# Patient Record
Sex: FEMALE | Race: WHITE | NOT HISPANIC OR LATINO | Employment: STUDENT | URBAN - METROPOLITAN AREA
[De-identification: names, ages, dates, MRNs, and addresses within clinical notes are randomized per-mention and may not be internally consistent; named-entity substitution may affect disease eponyms.]

---
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
## 2024-12-16 ENCOUNTER — TELEPHONE (OUTPATIENT)
Dept: OBSTETRICS AND GYNECOLOGY | Facility: CLINIC | Age: 19
End: 2024-12-16

## 2024-12-16 NOTE — TELEPHONE ENCOUNTER
Caller: Gloria Grimes    Relationship:  Self    Best call back number: [phone]    PATIENT CALLED REQUESTING TO CANCEL SAME DAY APPT.    Did the patient call AFTER the start time of their scheduled appointment?  []YES  [x]NO    Was the patient's appointment rescheduled? []YES  [x]NO    Any additional information: WORK CONFLICT - PT WILL CALL BACK TO R/S

## 2025-02-10 ENCOUNTER — OFFICE VISIT (OUTPATIENT)
Dept: OBSTETRICS AND GYNECOLOGY | Age: 20
End: 2025-02-10
Payer: COMMERCIAL

## 2025-02-10 VITALS — HEIGHT: 64 IN

## 2025-02-10 DIAGNOSIS — O36.80X1 ENCOUNTER TO DETERMINE FETAL VIABILITY OF PREGNANCY, FETUS 1: Primary | ICD-10-CM

## 2025-02-10 DIAGNOSIS — O20.0 THREATENED ABORTION: ICD-10-CM

## 2025-02-10 RX ORDER — PRENATAL VIT/IRON FUM/FOLIC AC 27MG-0.8MG
1 TABLET ORAL DAILY
COMMUNITY

## 2025-02-10 NOTE — PROGRESS NOTES
"GYN Visit    2/10/2025    Patient: Gloria Grimes          MR#:[id number]      Chief Complaint   Patient presents with    Possible Pregnancy     New Gyn & Pregnancy Confirmation US - Pt LMP 25, Pt stating she had some vaginal bleeding after pelvic US today       History of Present Illness    19 y.o. 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    ________________________________________  There is no problem list on file for this patient.      History reviewed. No pertinent past medical history.    Past Surgical History:   Procedure Laterality Date    NO PAST SURGERIES         Social History     Tobacco Use   Smoking Status Never    Passive exposure: Never   Smokeless Tobacco Never       has a current medication list which includes the following prescription(s): prenatal vitamin 27-0.8 and clindamycin.  ________________________________________    Current contraception: none      The following portions of the patient's history were reviewed and updated as appropriate: allergies, current medications, past family history, past medical history, past social history, past surgical history, and problem list.    Review of Systems    Pertinent items are noted in HPI.     Objective   Physical Exam    Ht 162.6 cm (64.02\")   LMP 2024 (Exact Date)    BP Readings from Last 3 Encounters: " "  21 112/60 (60%, Z = 0.25 /  27%, Z = -0.61)*     *BP percentiles are based on the 2017 AAP Clinical Practice Guideline for girls      Wt Readings from Last 3 Encounters:   21 61.7 kg (136 lb) (73%, Z= 0.61)*   21 61.7 kg (136 lb) (73%, Z= 0.61)*     * Growth percentiles are based on [facility] (Girls, 2-20 Years) data.      BMI: Estimated body mass index is 23.33 kg/m² as calculated from the following:    Height as of 21: 162.6 cm (64.02\").    Weight as of 21: 61.7 kg (136 lb).    Lungs: non labored breathing, no wheezing or tachpnea  Extremities: extremities normal, atraumatic, no cyanosis or edema  Skin: Skin color, texture, turgor normal. No rashes or lesions  Neurologic: Grossly normal  General:   alert, appears stated age, and cooperative            See US report                     Assessment:      Diagnoses and all orders for this visit:    1. Encounter to determine fetal viability of pregnancy, fetus 1 (Primary)    2. Threatened   -     ABO / Rh  -     CBC (No Diff)  -     HCG, B-subunit, Quantitative  -     Progesterone        Follow-up 1 week for repeat ultrasound  Discussed miscarriage and warnings given      "

## 2025-02-11 LAB
ABO GROUP BLD: NORMAL
ERYTHROCYTE [DISTWIDTH] IN BLOOD BY AUTOMATED COUNT: 11.8 % (ref 12.3–15.4)
HCG INTACT+B SERPL-ACNC: NORMAL MIU/ML
HCT VFR BLD AUTO: 43.8 % (ref 34–46.6)
HGB BLD-MCNC: 15 G/DL (ref 12–15.9)
MCH RBC QN AUTO: 30.1 PG (ref 26.6–33)
MCHC RBC AUTO-ENTMCNC: 34.2 G/DL (ref 31.5–35.7)
MCV RBC AUTO: 88 FL (ref 79–97)
PLATELET # BLD AUTO: 302 10*3/MM3 (ref 140–450)
PROGEST SERPL-MCNC: 10.7 NG/ML
RBC # BLD AUTO: 4.98 10*6/MM3 (ref 3.77–5.28)
RH BLD: POSITIVE
WBC # BLD AUTO: 9.11 10*3/MM3 (ref 3.4–10.8)

## 2025-02-17 ENCOUNTER — OFFICE VISIT (OUTPATIENT)
Dept: OBSTETRICS AND GYNECOLOGY | Age: 20
End: 2025-02-17
Payer: COMMERCIAL

## 2025-02-17 VITALS
SYSTOLIC BLOOD PRESSURE: 124 MMHG | DIASTOLIC BLOOD PRESSURE: 76 MMHG | HEIGHT: 64 IN | BODY MASS INDEX: 23.22 KG/M2 | WEIGHT: 136 LBS

## 2025-02-17 DIAGNOSIS — Z3A.01 6 WEEKS GESTATION OF PREGNANCY: Primary | ICD-10-CM

## 2025-02-17 DIAGNOSIS — O20.0 THREATENED ABORTION: ICD-10-CM

## 2025-02-17 NOTE — PROGRESS NOTES
"GYN Visit    2025    Patient: Gloria Grimes          MR#:[id number]      Chief Complaint   Patient presents with    Possible Pregnancy     Gyn F/u & Pregnancy Confirmation US - Pt LMP 25, Pt c/o spotting since last US on 2/10/25       History of Present Illness    19 y.o. female  who presents for follow-up to pregnancy confirmation    Patient feels well  No nausea vomiting  Ultrasound was done today showing a 6-week IUP with a heartbeat  Progression is as expected  Patient is very happy with the news  She is taking a prenatal vitamin  I recommend we follow-up in 2 weeks for a recheck of the ultrasound and proceed with her typical prenatal labs at that time      Patient's last menstrual period was 2024 (exact date).    ________________________________________  There is no problem list on file for this patient.      History reviewed. No pertinent past medical history.    Past Surgical History:   Procedure Laterality Date    NO PAST SURGERIES         Social History     Tobacco Use   Smoking Status Never    Passive exposure: Never   Smokeless Tobacco Never       has a current medication list which includes the following prescription(s): prenatal vitamin 27-0.8 and clindamycin.  ________________________________________    Current contraception: none      The following portions of the patient's history were reviewed and updated as appropriate: allergies, current medications, past family history, past medical history, past social history, past surgical history, and problem list.    Review of Systems    Pertinent items are noted in HPI.     Objective   Physical Exam    /76 (BP Location: Left arm, Patient Position: Sitting)   Ht 162.6 cm (64.02\")   Wt 61.7 kg (136 lb)   LMP 2024 (Exact Date)   BMI 23.33 kg/m²    BP Readings from Last 3 Encounters:   25 124/76   21 112/60 (60%, Z = 0.25 /  27%, Z = -0.61)*     *BP percentiles are based on the 2017 AAP Clinical Practice " "Guideline for girls      Wt Readings from Last 3 Encounters:   25 61.7 kg (136 lb)   21 61.7 kg (136 lb) (73%, Z= 0.61)*   21 61.7 kg (136 lb) (73%, Z= 0.61)*     * Growth percentiles are based on [facility] (Girls, 2-20 Years) data.      BMI: Estimated body mass index is 23.33 kg/m² as calculated from the following:    Height as of this encounter: 162.6 cm (64.02\").    Weight as of this encounter: 61.7 kg (136 lb).    Lungs: non labored breathing, no wheezing or tachpnea  Extremities: extremities normal, atraumatic, no cyanosis or edema  Skin: Skin color, texture, turgor normal. No rashes or lesions  Neurologic: Grossly normal  General:   alert, appears stated age, and cooperative                See ultrasound viable 6-week IUP                 Assessment:      Diagnoses and all orders for this visit:    1. 6 weeks gestation of pregnancy (Primary)    2. Threatened       Ultrasound today appears to be a viable IUP  Follow-up 2 weeks for repeat ultrasound and new OB labs        "

## 2025-02-18 ENCOUNTER — HOSPITAL ENCOUNTER (EMERGENCY)
Facility: HOSPITAL | Age: 20
Discharge: HOME | End: 2025-02-18
Attending: STUDENT IN AN ORGANIZED HEALTH CARE EDUCATION/TRAINING PROGRAM | Admitting: STUDENT IN AN ORGANIZED HEALTH CARE EDUCATION/TRAINING PROGRAM
Payer: COMMERCIAL

## 2025-02-18 ENCOUNTER — TELEPHONE (OUTPATIENT)
Dept: OBSTETRICS AND GYNECOLOGY | Age: 20
End: 2025-02-18
Payer: COMMERCIAL

## 2025-02-18 ENCOUNTER — APPOINTMENT (OUTPATIENT)
Dept: ULTRASOUND IMAGING | Facility: HOSPITAL | Age: 20
End: 2025-02-18
Payer: COMMERCIAL

## 2025-02-18 VITALS
HEART RATE: 106 BPM | OXYGEN SATURATION: 99 % | HEIGHT: 64 IN | DIASTOLIC BLOOD PRESSURE: 82 MMHG | BODY MASS INDEX: 23.22 KG/M2 | RESPIRATION RATE: 14 BRPM | WEIGHT: 136 LBS | TEMPERATURE: 99 F | SYSTOLIC BLOOD PRESSURE: 140 MMHG

## 2025-02-18 DIAGNOSIS — O03.9 SPONTANEOUS ABORTION IN FIRST TRIMESTER: Primary | ICD-10-CM

## 2025-02-18 LAB
ALBUMIN SERPL-MCNC: 4.7 G/DL (ref 3.5–5.2)
ALBUMIN/GLOB SERPL: 1.5 G/DL
ALP SERPL-CCNC: 59 U/L (ref 39–117)
ALT SERPL W P-5'-P-CCNC: 29 U/L (ref 1–33)
ANION GAP SERPL CALCULATED.3IONS-SCNC: 10.6 MMOL/L (ref 5–15)
AST SERPL-CCNC: 19 U/L (ref 1–32)
BASOPHILS # BLD AUTO: 0.03 10*3/MM3 (ref 0–0.2)
BASOPHILS NFR BLD AUTO: 0.3 % (ref 0–1.5)
BILIRUB SERPL-MCNC: 0.3 MG/DL (ref 0–1.2)
BILIRUB UR QL STRIP: NEGATIVE
BUN SERPL-MCNC: 8 MG/DL (ref 6–20)
BUN/CREAT SERPL: 12.3 (ref 7–25)
CALCIUM SPEC-SCNC: 9.7 MG/DL (ref 8.6–10.5)
CHLORIDE SERPL-SCNC: 100 MMOL/L (ref 98–107)
CLARITY UR: ABNORMAL
CO2 SERPL-SCNC: 26.4 MMOL/L (ref 22–29)
COLOR UR: ABNORMAL
CREAT SERPL-MCNC: 0.65 MG/DL (ref 0.57–1)
DEPRECATED RDW RBC AUTO: 38.5 FL (ref 37–54)
EGFRCR SERPLBLD CKD-EPI 2021: 129.4 ML/MIN/1.73
EOSINOPHIL # BLD AUTO: 0.13 10*3/MM3 (ref 0–0.4)
EOSINOPHIL NFR BLD AUTO: 1.2 % (ref 0.3–6.2)
ERYTHROCYTE [DISTWIDTH] IN BLOOD BY AUTOMATED COUNT: 11.9 % (ref 12.3–15.4)
GLOBULIN UR ELPH-MCNC: 3.1 GM/DL
GLUCOSE SERPL-MCNC: 94 MG/DL (ref 65–99)
GLUCOSE UR STRIP-MCNC: NEGATIVE MG/DL
HCG INTACT+B SERPL-ACNC: 4841 MIU/ML
HCT VFR BLD AUTO: 40.4 % (ref 34–46.6)
HGB BLD-MCNC: 13.7 G/DL (ref 12–15.9)
HGB UR QL STRIP.AUTO: ABNORMAL
IMM GRANULOCYTES # BLD AUTO: 0.01 10*3/MM3 (ref 0–0.05)
IMM GRANULOCYTES NFR BLD AUTO: 0.1 % (ref 0–0.5)
INR PPP: 1
KETONES UR QL STRIP: ABNORMAL
LEUKOCYTE ESTERASE UR QL STRIP.AUTO: NEGATIVE
LYMPHOCYTES # BLD AUTO: 2.2 10*3/MM3 (ref 0.7–3.1)
LYMPHOCYTES NFR BLD AUTO: 19.6 % (ref 19.6–45.3)
MCH RBC QN AUTO: 29.8 PG (ref 26.6–33)
MCHC RBC AUTO-ENTMCNC: 33.9 G/DL (ref 31.5–35.7)
MCV RBC AUTO: 87.8 FL (ref 79–97)
MONOCYTES # BLD AUTO: 0.83 10*3/MM3 (ref 0.1–0.9)
MONOCYTES NFR BLD AUTO: 7.4 % (ref 5–12)
NEUTROPHILS NFR BLD AUTO: 71.4 % (ref 42.7–76)
NEUTROPHILS NFR BLD AUTO: 8.01 10*3/MM3 (ref 1.7–7)
NITRITE UR QL STRIP: NEGATIVE
PH UR STRIP.AUTO: 6.5 [PH] (ref 5–8)
PLATELET # BLD AUTO: 264 10*3/MM3 (ref 140–450)
PMV BLD AUTO: 9.8 FL (ref 6–12)
POTASSIUM SERPL-SCNC: 3.7 MMOL/L (ref 3.5–5.2)
PROT SERPL-MCNC: 7.8 G/DL (ref 6–8.5)
PROT UR QL STRIP: ABNORMAL
PROTHROMBIN TIME: 11.7 SECONDS (ref 11–15)
RBC # BLD AUTO: 4.6 10*6/MM3 (ref 3.77–5.28)
SODIUM SERPL-SCNC: 137 MMOL/L (ref 136–145)
SP GR UR STRIP: 1.02 (ref 1–1.03)
UROBILINOGEN UR QL STRIP: ABNORMAL
WBC NRBC COR # BLD AUTO: 11.21 10*3/MM3 (ref 3.4–10.8)

## 2025-02-18 PROCEDURE — 85610 PROTHROMBIN TIME: CPT

## 2025-02-18 PROCEDURE — 93976 VASCULAR STUDY: CPT

## 2025-02-18 PROCEDURE — 99283 EMERGENCY DEPT VISIT LOW MDM: CPT | Performed by: STUDENT IN AN ORGANIZED HEALTH CARE EDUCATION/TRAINING PROGRAM

## 2025-02-18 PROCEDURE — 76817 TRANSVAGINAL US OBSTETRIC: CPT

## 2025-02-18 PROCEDURE — 88305 TISSUE EXAM BY PATHOLOGIST: CPT | Performed by: STUDENT IN AN ORGANIZED HEALTH CARE EDUCATION/TRAINING PROGRAM

## 2025-02-18 PROCEDURE — 81003 URINALYSIS AUTO W/O SCOPE: CPT | Performed by: STUDENT IN AN ORGANIZED HEALTH CARE EDUCATION/TRAINING PROGRAM

## 2025-02-18 PROCEDURE — 99284 EMERGENCY DEPT VISIT MOD MDM: CPT

## 2025-02-18 PROCEDURE — 76815 OB US LIMITED FETUS(S): CPT

## 2025-02-18 PROCEDURE — 80053 COMPREHEN METABOLIC PANEL: CPT | Performed by: STUDENT IN AN ORGANIZED HEALTH CARE EDUCATION/TRAINING PROGRAM

## 2025-02-18 PROCEDURE — 85025 COMPLETE CBC W/AUTO DIFF WBC: CPT | Performed by: STUDENT IN AN ORGANIZED HEALTH CARE EDUCATION/TRAINING PROGRAM

## 2025-02-18 PROCEDURE — 84702 CHORIONIC GONADOTROPIN TEST: CPT | Performed by: STUDENT IN AN ORGANIZED HEALTH CARE EDUCATION/TRAINING PROGRAM

## 2025-02-18 NOTE — FSED PROVIDER NOTE
Subjective   History of Present Illness  19-year-old female  at 6 weeks pregnant presenting with complaint that she has vaginal bleeding that was bleeding through 3-4 pads over the past 3 hours.  She states that she just had a ultrasound yesterday confirm IUP with her OB Dr. Ballard.  She states that everything was okay but she started bleeding today she has got abdominal cramping, mild nausea, no other symptoms including no diarrhea.        Review of Systems    History reviewed. No pertinent past medical history.    No Known Allergies    Past Surgical History:   Procedure Laterality Date    NO PAST SURGERIES         Family History   Problem Relation Age of Onset    Heart disease Mother     Hypertension Mother     Kidney disease Maternal Grandmother     Breast cancer Neg Hx     Ovarian cancer Neg Hx     Colon cancer Neg Hx     Uterine cancer Neg Hx        Social History     Socioeconomic History    Marital status: Single   Tobacco Use    Smoking status: Never     Passive exposure: Never    Smokeless tobacco: Never   Vaping Use    Vaping status: Never Used   Substance and Sexual Activity    Alcohol use: Never    Drug use: Never    Sexual activity: Yes     Partners: Male     Birth control/protection: None           Objective   Physical Exam  Vitals and nursing note reviewed. Exam conducted with a chaperone present.   Constitutional:       General: She is not in acute distress.     Appearance: Normal appearance. She is not ill-appearing, toxic-appearing or diaphoretic.   HENT:      Head: Normocephalic and atraumatic.      Nose: Nose normal.      Mouth/Throat:      Pharynx: No oropharyngeal exudate or posterior oropharyngeal erythema.   Eyes:      Extraocular Movements: Extraocular movements intact.      Conjunctiva/sclera: Conjunctivae normal.      Pupils: Pupils are equal, round, and reactive to light.   Cardiovascular:      Rate and Rhythm: Normal rate and regular rhythm.   Pulmonary:      Effort: Pulmonary  effort is normal. No respiratory distress.      Breath sounds: Normal breath sounds. No stridor. No wheezing, rhonchi or rales.   Abdominal:      General: Abdomen is flat. There is no distension.      Tenderness: There is abdominal tenderness (Predominantly suprapubic). There is no right CVA tenderness, left CVA tenderness, guarding or rebound.      Hernia: No hernia is present.   Musculoskeletal:         General: No swelling, tenderness, deformity or signs of injury. Normal range of motion.      Cervical back: Normal range of motion.   Skin:     General: Skin is warm and dry.      Capillary Refill: Capillary refill takes less than 2 seconds.      Findings: No erythema or rash.   Neurological:      General: No focal deficit present.      Mental Status: She is alert and oriented to person, place, and time. Mental status is at baseline.      Cranial Nerves: No cranial nerve deficit.      Sensory: No sensory deficit.      Motor: No weakness.   Psychiatric:         Mood and Affect: Mood normal.         Procedures           ED Course                                           Medical Decision Making  History and physical exam remarkable for a 19-year-old female here for evaluation of a threatened , will get ultrasounds as well as blood work and urinalysis for first trimester threatened  workup.    Amount and/or Complexity of Data Reviewed  Labs: ordered.  Radiology: ordered.    Patient signed out at 7 PM due to shift change    Final diagnoses:   None       ED Disposition  ED Disposition       None            No follow-up provider specified.       Medication List      No changes were made to your prescriptions during this visit.

## 2025-02-18 NOTE — TELEPHONE ENCOUNTER
PT states she was seen yesterday for appointment and US. PT is 6weeks 4days. PT states she had some light brown spotting before she came to her appointment yesterday. PT states today she passed a clot the size of a quarter PT states bleeding was heavier after yesterdays US with cramping. PT states bleeding has slowed down some but not bleeding through a pad an hour. Please advise.

## 2025-02-18 NOTE — Clinical Note
[facility]  [address]  [place] [address]    Gloria Grimes was seen and treated in our emergency department on 2/18/2025.  She may return to work on 02/21/2025.         Thank you for choosing [facility].    Ayaan Tirado MD

## 2025-02-19 ENCOUNTER — TELEPHONE (OUTPATIENT)
Dept: OBSTETRICS AND GYNECOLOGY | Age: 20
End: 2025-02-19

## 2025-02-19 NOTE — TELEPHONE ENCOUNTER
Provider:  ASHA REYNOSO    Caller: KENA AKHTAR     Phone Number: [phone]     Reason for Call: PATIENT ENDED UP GOING TO THE ER LAST NIGHT BECAUSE THE BLEEDING INCREASED AND SHE WAS ADVISED THAT SHE WAS HAVING A MISCARRIAGE//PATIENT WASN'T SURE IF SHE STILL NEEDED TO COME TODAY//PLEASE FOLLOW UP

## 2025-02-24 LAB
CYTO UR: NORMAL
LAB AP CASE REPORT: NORMAL
LAB AP DIAGNOSIS COMMENT: NORMAL
PATH REPORT.FINAL DX SPEC: NORMAL
PATH REPORT.GROSS SPEC: NORMAL

## 2025-03-06 ENCOUNTER — OFFICE VISIT (OUTPATIENT)
Dept: OBSTETRICS AND GYNECOLOGY | Age: 20
End: 2025-03-06
Payer: COMMERCIAL

## 2025-03-06 VITALS
HEIGHT: 64 IN | BODY MASS INDEX: 23.22 KG/M2 | WEIGHT: 136 LBS | SYSTOLIC BLOOD PRESSURE: 112 MMHG | DIASTOLIC BLOOD PRESSURE: 74 MMHG

## 2025-03-06 DIAGNOSIS — O03.9 COMPLETE ABORTION: Primary | ICD-10-CM

## 2025-03-06 NOTE — PROGRESS NOTES
"GYN Visit    3/6/2025    Patient: Gloria Girmes          MR#:[id number]      Chief Complaint   Patient presents with    Follow-up     Gyn F/u - recent miscarriage, discuss plan moving forward, pt stating she is feeling better & no longer having any pain or bleeding       History of Present Illness    20 y.o. female  who presents for follow-up after complete miscarriage    Patient had a miscarriage recently she has stopped bleeding  She is doing well emotionally  We discussed that she is due for Pap smear but was not mentally ready for that today  We will have her come back in about 3 months for an annual exam  The patient is unsure whether she is going to try for pregnancy again  Recommend stay on a prenatal vitamin  Discussed birth control pills and IUD if she should need that in the future  No other concerns          Patient's last menstrual period was 2024 (exact date).    ________________________________________  There is no problem list on file for this patient.      History reviewed. No pertinent past medical history.    Past Surgical History:   Procedure Laterality Date    NO PAST SURGERIES         Social History     Tobacco Use   Smoking Status Never    Passive exposure: Never   Smokeless Tobacco Never       has a current medication list which includes the following prescription(s): clindamycin and prenatal vitamin 27-0.8.  ________________________________________    Current contraception: none      The following portions of the patient's history were reviewed and updated as appropriate: allergies, current medications, past family history, past medical history, past social history, past surgical history, and problem list.    Review of Systems    Pertinent items are noted in HPI.     Objective   Physical Exam    /74 (BP Location: Left arm, Patient Position: Sitting)   Ht 162.6 cm (64\")   Wt 61.7 kg (136 lb)   LMP 2024 (Exact Date)   Breastfeeding No   BMI 23.34 kg/m²    BP " "Readings from Last 3 Encounters:   25 112/74   25 140/82   25 124/76      Wt Readings from Last 3 Encounters:   25 61.7 kg (136 lb)   25 61.7 kg (136 lb)   25 61.7 kg (136 lb)      BMI: Estimated body mass index is 23.34 kg/m² as calculated from the following:    Height as of this encounter: 162.6 cm (64\").    Weight as of this encounter: 61.7 kg (136 lb).    Lungs: non labored breathing, no wheezing or tachpnea  Extremities: extremities normal, atraumatic, no cyanosis or edema  Skin: Skin color, texture, turgor normal. No rashes or lesions  Neurologic: Grossly normal  General:   alert, appears stated age, and cooperative   Abdomen: soft, non-tender, without masses or organomegaly                             Assessment:      Diagnoses and all orders for this visit:    1. Complete  (Primary)  -     HCG, B-subunit, Quantitative      Follow-up 3 months for annual exam and Pap        "

## 2025-03-07 LAB — HCG INTACT+B SERPL-ACNC: 11.5 MIU/ML

## 2025-03-27 ENCOUNTER — TELEPHONE (OUTPATIENT)
Dept: OBSTETRICS AND GYNECOLOGY | Age: 20
End: 2025-03-27
Payer: COMMERCIAL

## 2025-03-27 DIAGNOSIS — N92.6 IRREGULAR MENSES: Primary | ICD-10-CM

## 2025-03-27 NOTE — TELEPHONE ENCOUNTER
Pt called and stated that she received a positive pregnancy test.  Pt stated she hasn't had a cycle since she was seen for her SAB on 3/6/25.  Please advise if pt needs to come in for labs or to be seen for an OV

## 2025-03-28 LAB — HCG INTACT+B SERPL-ACNC: 352 MIU/ML

## 2025-04-10 ENCOUNTER — INITIAL PRENATAL (OUTPATIENT)
Dept: OBSTETRICS AND GYNECOLOGY | Age: 20
End: 2025-04-10
Payer: COMMERCIAL

## 2025-04-10 VITALS — SYSTOLIC BLOOD PRESSURE: 112 MMHG | BODY MASS INDEX: 23.69 KG/M2 | WEIGHT: 138 LBS | DIASTOLIC BLOOD PRESSURE: 74 MMHG

## 2025-04-10 DIAGNOSIS — Z34.91 PRENATAL CARE, FIRST TRIMESTER: ICD-10-CM

## 2025-04-10 DIAGNOSIS — O21.9 NAUSEA AND VOMITING OF PREGNANCY, ANTEPARTUM: ICD-10-CM

## 2025-04-10 DIAGNOSIS — Z13.89 SCREENING FOR BLOOD OR PROTEIN IN URINE: Primary | ICD-10-CM

## 2025-04-10 DIAGNOSIS — Z3A.01 6 WEEKS GESTATION OF PREGNANCY: ICD-10-CM

## 2025-04-10 LAB
GLUCOSE UR STRIP-MCNC: NEGATIVE MG/DL
PROT UR STRIP-MCNC: NEGATIVE MG/DL

## 2025-04-10 RX ORDER — ONDANSETRON 4 MG/1
4 TABLET, FILM COATED ORAL EVERY 8 HOURS PRN
Qty: 30 TABLET | Refills: 1 | Status: SHIPPED | OUTPATIENT
Start: 2025-04-10 | End: 2026-04-10

## 2025-04-10 RX ORDER — DOXYLAMINE SUCCINATE AND PYRIDOXINE HYDROCHLORIDE 20; 20 MG/1; MG/1
1 TABLET, EXTENDED RELEASE ORAL DAILY
Qty: 60 TABLET | Refills: 2 | Status: SHIPPED | OUTPATIENT
Start: 2025-04-10

## 2025-04-10 RX ORDER — PROMETHAZINE HYDROCHLORIDE 12.5 MG/1
12.5 TABLET ORAL EVERY 6 HOURS PRN
Qty: 30 TABLET | Refills: 1 | Status: SHIPPED | OUTPATIENT
Start: 2025-04-10

## 2025-04-10 NOTE — PROGRESS NOTES
Patient is having nausea and vomiting  Ultrasound was done today which was reassuring  She will be dated by today's ultrasound since she did not have an actual last menstrual period  She had a recent miscarriage  She is taking a prenatal vitamin  Discussed Bonjesta and over-the-counter hack of Unisom  Sent in Phenergan and Zofran as a toolkit for managing her nausea  Follow-up 4 weeks for panorama and Horizon  Labs today    Chief Complaint   Patient presents with    Initial Prenatal Visit     Gyn F/u & US - (+) pregnancy, no LMP due to recent SAB on 2/18/25, pt c/o nausea & vomiting that started yesterday       HPI:  Pt presents for routine prenatal visit    ROS:  No fever or chills, no nausea or vomiting, no contractions, no leg pain, no LE edema, no leaking fluid, no bleeding, no headache, no dysuria  All other systems reviewed and negative    Exam:  See flow sheet  General:  Alert and oriented and no distress  Neck: no lymphadenopathy or thyromegaly  Lungs: non - labored breathing  Abd:  See flow sheet, fundus nontender  Ext: see flow sheet, non-tender bilateral , no lesions  Neuro: grossly normal    Assessment:/ PLAN:    Diagnoses and all orders for this visit:    1. Screening for blood or protein in urine (Primary)  -     POC Urinalysis Dipstick    2. 6 weeks gestation of pregnancy  -     OB Panel With HIV and RPR  -     Urine Culture - Urine, Urine, Clean Catch  -     Chlamydia / Gonococcus / Mycoplasma Genitalium, DENI, Urine - Urine, Clean Catch    3. Nausea and vomiting of pregnancy, antepartum    4. Prenatal care, first trimester    Other orders  -     doxylamine-pyridoxine ER (Bonjesta) 20-20 MG tablet controlled-release tablet; Take 1 tablet by mouth Daily.  Dispense: 60 tablet; Refill: 2  -     promethazine (PHENERGAN) 12.5 MG tablet; Take 1 tablet by mouth Every 6 (Six) Hours As Needed for Nausea or Vomiting.  Dispense: 30 tablet; Refill: 1  -     ondansetron (Zofran) 4 MG tablet; Take 1 tablet by mouth  Every 8 (Eight) Hours As Needed for Nausea or Vomiting.  Dispense: 30 tablet; Refill: 1

## 2025-04-13 LAB
C TRACH RRNA UR QL NAA+PROBE: NEGATIVE
M GENITALIUM DNA UR QL NAA+PROBE: NEGATIVE
N GONORRHOEA RRNA UR QL NAA+PROBE: NEGATIVE

## 2025-04-15 LAB
ABO GROUP BLD: ABNORMAL
BACTERIA UR CULT: ABNORMAL
BACTERIA UR CULT: ABNORMAL
BASOPHILS # BLD AUTO: 0.1 X10E3/UL (ref 0–0.2)
BASOPHILS NFR BLD AUTO: 1 %
BLD GP AB SCN SERPL QL: NEGATIVE
EOSINOPHIL # BLD AUTO: 0 X10E3/UL (ref 0–0.4)
EOSINOPHIL NFR BLD AUTO: 0 %
ERYTHROCYTE [DISTWIDTH] IN BLOOD BY AUTOMATED COUNT: 12.1 % (ref 11.7–15.4)
HBV SURFACE AG SERPL QL IA: NEGATIVE
HCT VFR BLD AUTO: 44.4 % (ref 34–46.6)
HCV AB SERPL QL IA: NON REACTIVE
HCV AB SERPL QL IA: NORMAL
HGB BLD-MCNC: 15 G/DL (ref 11.1–15.9)
HIV 1+2 AB+HIV1 P24 AG SERPL QL IA: NON REACTIVE
IMM GRANULOCYTES # BLD AUTO: 0 X10E3/UL (ref 0–0.1)
IMM GRANULOCYTES NFR BLD AUTO: 0 %
LYMPHOCYTES # BLD AUTO: 1.2 X10E3/UL (ref 0.7–3.1)
LYMPHOCYTES NFR BLD AUTO: 13 %
MCH RBC QN AUTO: 30.9 PG (ref 26.6–33)
MCHC RBC AUTO-ENTMCNC: 33.8 G/DL (ref 31.5–35.7)
MCV RBC AUTO: 92 FL (ref 79–97)
MONOCYTES # BLD AUTO: 0.4 X10E3/UL (ref 0.1–0.9)
MONOCYTES NFR BLD AUTO: 5 %
NEUTROPHILS # BLD AUTO: 7.6 X10E3/UL (ref 1.4–7)
NEUTROPHILS NFR BLD AUTO: 81 %
OTHER ANTIBIOTIC SUSC ISLT: ABNORMAL
PLATELET # BLD AUTO: 281 X10E3/UL (ref 150–450)
RBC # BLD AUTO: 4.85 X10E6/UL (ref 3.77–5.28)
RH BLD: POSITIVE
RPR SER QL: NON REACTIVE
RUBV IGG SERPL IA-ACNC: <0.9 INDEX
WBC # BLD AUTO: 9.3 X10E3/UL (ref 3.4–10.8)

## 2025-05-12 ENCOUNTER — ROUTINE PRENATAL (OUTPATIENT)
Dept: OBSTETRICS AND GYNECOLOGY | Age: 20
End: 2025-05-12
Payer: COMMERCIAL

## 2025-05-12 VITALS — SYSTOLIC BLOOD PRESSURE: 132 MMHG | DIASTOLIC BLOOD PRESSURE: 78 MMHG | BODY MASS INDEX: 24.2 KG/M2 | WEIGHT: 141 LBS

## 2025-05-12 DIAGNOSIS — Z31.430 ENCOUNTER OF FEMALE FOR TESTING FOR GENETIC DISEASE CARRIER STATUS FOR PROCREATIVE MANAGEMENT: ICD-10-CM

## 2025-05-12 DIAGNOSIS — Z3A.10 10 WEEKS GESTATION OF PREGNANCY: Primary | ICD-10-CM

## 2025-05-12 DIAGNOSIS — Z34.91 PRENATAL CARE, FIRST TRIMESTER: ICD-10-CM

## 2025-05-12 DIAGNOSIS — Z34.81 PRENATAL CARE, SUBSEQUENT PREGNANCY, FIRST TRIMESTER: ICD-10-CM

## 2025-05-12 NOTE — PROGRESS NOTES
Patient is feeling okay  She still has a little nausea  She is taking her prenatal vitamins  Bedside ultrasound shows good fetal heart tones  She will do the panorama today  Follow-up 4 weeks    Chief Complaint   Patient presents with    Routine Prenatal Visit     Ob Check - Pt is 10w5d, Rhonda today, Pt c/o occasional nausea otherwise doing well       HPI:  Pt presents for routine prenatal visit    ROS:  No fever or chills, no nausea or vomiting, no contractions, no leg pain, no LE edema, no leaking fluid, no bleeding, no headache, no dysuria  All other systems reviewed and negative    Exam:  See flow sheet  General:  Alert and oriented and no distress  Neck: no lymphadenopathy or thyromegaly  Lungs: non - labored breathing  Abd:  See flow sheet, fundus nontender  Ext: see flow sheet, non-tender bilateral , no lesions  Neuro: grossly normal    Assessment:/ PLAN:    Diagnoses and all orders for this visit:    1. 10 weeks gestation of pregnancy (Primary)  -     Cancel: POC Urinalysis Dipstick    2. Prenatal care, subsequent pregnancy, first trimester  -     Rhonda Panorama Prenatal Test: Chromosomes 13, 18, 21, X & Y: Triploidy 22Q.11.2 Deletion - Blood,    3. Encounter of female for testing for genetic disease carrier status for procreative management  -     Rhonda Horizon 14 (Pan-Ethnic Standard) - Blood,    4. Prenatal care, first trimester

## 2025-05-20 LAB
Lab: NEGATIVE
Lab: NORMAL
NTRA ALPHA-THALASSEMIA: NEGATIVE
NTRA BETA-HEMOGLOBINOPATHIES: NEGATIVE
NTRA CANAVAN DISEASE: NEGATIVE
NTRA CYSTIC FIBROSIS: NEGATIVE
NTRA DUCHENNE/BECKER MUSCULAR DYSTROPHY: NEGATIVE
NTRA FAMILIAL DYSAUTONOMIA: NEGATIVE
NTRA FRAGILE X SYNDROME: NEGATIVE
NTRA GALACTOSEMIA: NEGATIVE
NTRA GAUCHER DISEASE: NEGATIVE
NTRA MEDIUM CHAIN ACYL-COA DEHYDROGENASE DEFICIENCY: NEGATIVE
NTRA POLYCYSTIC KIDNEY DISEASE, AUTOSOMAL RECESSIVE: NEGATIVE
NTRA SMITH-LEMLI-OPITZ SYNDROME: NEGATIVE
NTRA SPINAL MUSCULAR ATROPHY: NEGATIVE
NTRA TAY-SACHS DISEASE: NEGATIVE

## 2025-06-09 ENCOUNTER — ROUTINE PRENATAL (OUTPATIENT)
Dept: OBSTETRICS AND GYNECOLOGY | Age: 20
End: 2025-06-09
Payer: COMMERCIAL

## 2025-06-09 VITALS — SYSTOLIC BLOOD PRESSURE: 114 MMHG | BODY MASS INDEX: 24.55 KG/M2 | DIASTOLIC BLOOD PRESSURE: 74 MMHG | WEIGHT: 143 LBS

## 2025-06-09 DIAGNOSIS — Z34.92 PRENATAL CARE, SECOND TRIMESTER: ICD-10-CM

## 2025-06-09 DIAGNOSIS — Z3A.14 14 WEEKS GESTATION OF PREGNANCY: Primary | ICD-10-CM

## 2025-06-09 DIAGNOSIS — O21.9 NAUSEA AND VOMITING OF PREGNANCY, ANTEPARTUM: ICD-10-CM

## 2025-06-09 RX ORDER — ONDANSETRON 4 MG/1
4 TABLET, FILM COATED ORAL EVERY 8 HOURS PRN
Qty: 30 TABLET | Refills: 1 | Status: SHIPPED | OUTPATIENT
Start: 2025-06-09 | End: 2026-06-09

## 2025-06-09 NOTE — PROGRESS NOTES
Patient is feeling well  Nausea is improved no complaints today  She would like a refill of her Zofran but she is not needing it nearly as much  Follow-up 4 weeks for AFP screen and anatomy scan    Chief Complaint   Patient presents with    Routine Prenatal Visit     Ob Check - Pt is 14w5d, Pt has no complaints today       HPI:  Pt presents for routine prenatal visit    ROS:  No fever or chills, no nausea or vomiting, no contractions, no leg pain, no LE edema, no leaking fluid, no bleeding, no headache, no dysuria  All other systems reviewed and negative    Exam:  See flow sheet  General:  Alert and oriented and no distress  Neck: no lymphadenopathy or thyromegaly  Lungs: non - labored breathing  Abd:  See flow sheet, fundus nontender  Ext: see flow sheet, non-tender bilateral , no lesions  Neuro: grossly normal    Assessment:/ PLAN:    Diagnoses and all orders for this visit:    1. 14 weeks gestation of pregnancy (Primary)  -     Cancel: POC Urinalysis Dipstick    2. Prenatal care, second trimester    3. Nausea and vomiting of pregnancy, antepartum    Other orders  -     ondansetron (Zofran) 4 MG tablet; Take 1 tablet by mouth Every 8 (Eight) Hours As Needed for Nausea or Vomiting.  Dispense: 30 tablet; Refill: 1

## 2025-07-08 ENCOUNTER — ROUTINE PRENATAL (OUTPATIENT)
Dept: OBSTETRICS AND GYNECOLOGY | Age: 20
End: 2025-07-08
Payer: COMMERCIAL

## 2025-07-08 VITALS — DIASTOLIC BLOOD PRESSURE: 70 MMHG | SYSTOLIC BLOOD PRESSURE: 104 MMHG | BODY MASS INDEX: 25.58 KG/M2 | WEIGHT: 149 LBS

## 2025-07-08 DIAGNOSIS — Z36.86 ENCOUNTER FOR ANTENATAL SCREENING FOR CERVICAL LENGTH: ICD-10-CM

## 2025-07-08 DIAGNOSIS — Z3A.18 18 WEEKS GESTATION OF PREGNANCY: Primary | ICD-10-CM

## 2025-07-08 DIAGNOSIS — Z36.89 SCREENING, ANTENATAL, FOR FETAL ANATOMIC SURVEY: ICD-10-CM

## 2025-07-08 DIAGNOSIS — Z13.79 ENCOUNTER FOR GENETIC SCREENING FOR BIRTH DEFECT: ICD-10-CM

## 2025-07-08 LAB
GLUCOSE UR STRIP-MCNC: NEGATIVE MG/DL
PROT UR STRIP-MCNC: NEGATIVE MG/DL

## 2025-07-08 PROCEDURE — 0502F SUBSEQUENT PRENATAL CARE: CPT | Performed by: OBSTETRICS & GYNECOLOGY

## 2025-07-08 NOTE — PROGRESS NOTES
Patient is feeling well  Ultrasound was done today for anatomy and it was complete and normal  Vertex presentation  AFP screen today  Follow-up 4 weeks    Chief Complaint   Patient presents with    Routine Prenatal Visit     Ob Check & Anatomy US - Pt is 18w6d, Pt has no complaints today       HPI:  Pt presents for routine prenatal visit    ROS:  No fever or chills, no nausea or vomiting, no contractions, no leg pain, no LE edema, no leaking fluid, no bleeding, no headache, no dysuria  All other systems reviewed and negative    Exam:  See flow sheet  General:  Alert and oriented and no distress  Neck: no lymphadenopathy or thyromegaly  Lungs: non - labored breathing  Abd:  See flow sheet, fundus nontender  Ext: see flow sheet, non-tender bilateral , no lesions  Neuro: grossly normal    Assessment:/ PLAN:    Diagnoses and all orders for this visit:    1. 18 weeks gestation of pregnancy (Primary)  -     POC Urinalysis Dipstick  -     Alpha Fetoprotein, Maternal    2. Screening, , for fetal anatomic survey    3. Encounter for  screening for cervical length    4. Encounter for genetic screening for birth defect  -     Alpha Fetoprotein, Maternal

## 2025-07-10 LAB
AFP INTERP SERPL-IMP: NORMAL
AFP INTERP SERPL-IMP: NORMAL
AFP MOM SERPL: 2.1
AFP SERPL QL: NORMAL
AFP SERPL-MCNC: 61 NG/ML
AGE AT DELIVERY: 21.8 YR
GA METHOD: NORMAL
GA: 15 WEEKS
IDDM PATIENT QL: NO
MULTIPLE PREGNANCY: NO
NEURAL TUBE DEFECT RISK FETUS: 647 %
SERVICE CMNT-IMP: NORMAL

## 2025-08-05 ENCOUNTER — ROUTINE PRENATAL (OUTPATIENT)
Dept: OBSTETRICS AND GYNECOLOGY | Age: 20
End: 2025-08-05
Payer: COMMERCIAL

## 2025-08-05 VITALS — BODY MASS INDEX: 26.61 KG/M2 | WEIGHT: 155 LBS | DIASTOLIC BLOOD PRESSURE: 76 MMHG | SYSTOLIC BLOOD PRESSURE: 110 MMHG

## 2025-08-05 DIAGNOSIS — Z3A.22 22 WEEKS GESTATION OF PREGNANCY: Primary | ICD-10-CM

## 2025-08-05 DIAGNOSIS — Z34.92 PRENATAL CARE, SECOND TRIMESTER: ICD-10-CM

## 2025-08-14 RX ORDER — ONDANSETRON 4 MG/1
4 TABLET, FILM COATED ORAL EVERY 8 HOURS PRN
Qty: 30 TABLET | Refills: 1 | Status: SHIPPED | OUTPATIENT
Start: 2025-08-14 | End: 2026-08-14